# Patient Record
Sex: FEMALE | Race: WHITE | HISPANIC OR LATINO
[De-identification: names, ages, dates, MRNs, and addresses within clinical notes are randomized per-mention and may not be internally consistent; named-entity substitution may affect disease eponyms.]

---

## 2022-07-18 PROBLEM — Z00.00 ENCOUNTER FOR PREVENTIVE HEALTH EXAMINATION: Status: ACTIVE | Noted: 2022-07-18

## 2024-05-29 ENCOUNTER — APPOINTMENT (OUTPATIENT)
Dept: ORTHOPEDIC SURGERY | Facility: CLINIC | Age: 59
End: 2024-05-29

## 2024-06-05 ENCOUNTER — APPOINTMENT (OUTPATIENT)
Dept: ORTHOPEDIC SURGERY | Facility: CLINIC | Age: 59
End: 2024-06-05
Payer: MEDICAID

## 2024-06-05 DIAGNOSIS — M19.031 PRIMARY OSTEOARTHRITIS, RIGHT WRIST: ICD-10-CM

## 2024-06-05 DIAGNOSIS — M19.049 PRIMARY OSTEOARTHRITIS, UNSPECIFIED HAND: ICD-10-CM

## 2024-06-05 PROCEDURE — 99203 OFFICE O/P NEW LOW 30 MIN: CPT

## 2024-06-05 PROCEDURE — 73110 X-RAY EXAM OF WRIST: CPT | Mod: 50

## 2024-06-05 PROCEDURE — 73140 X-RAY EXAM OF FINGER(S): CPT | Mod: F2

## 2024-06-05 NOTE — ASSESSMENT
[FreeTextEntry1] : The condition was explained to the patient. Discussed that arthritis is a progressive degenerative process, and symptoms may have a waxing/waning course, which may be exacerbated by activity or trauma. Discussed treatment options - activity modification, bracing, steroid injection, or last resort surgery. Discussed increased propensity for stiffness due to underlying arthritis.  Given severity of hand arthritis with erosive changes of PIPJ and auto-fusion of RIGHT small finger, recommend re-evaluation with Rheumatologist to evaluate for autoimmune / inflammatory disorder and medical management of arthritis. Patient expressed understanding.  F/u with me PRN.

## 2024-06-05 NOTE — HISTORY OF PRESENT ILLNESS
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 c/o pain with cold weather and heavy lifting. Reports h/o RIGHT wrist fx ~9 years ago s/p ORIF and subsequent HUMBERTO ~1 year later by Dr. Ingram. - LEFT middle finger pain and swelling x 1.5 years. Reports that she saw a Rheumatologist years ago, and labs were wnl.  Hx: none. [FreeTextEntry1] : BILATERAL wrist and LEFT middle finger  [FreeTextEntry5] : CARLA JOSE MANUEL colbert [RHD] 59 year old female is here today c/o BILATERAL wrist pain x 5 years and LEFT middle finger pain and locking x 1.5 years. denies treatment for LEFT middle finger. h/o RIGHT wrist fracture ~9 years ago, surgically repaired by Dr. Ingram (hardware removed ~8 years ago). increased pain in wrists with cold weather and lifting heavy objects.  [de-identified] : ~5 years ago

## 2024-06-05 NOTE — IMAGING
[de-identified] : LEFT HAND nodular enlargement and swelling of IPJ of multiple digits. skin intact.  TTP to MF PIPJ. wrist ROM: good extension, flexion. good pronation, supination. good EPL, FPL. good finger extension, flex to half-fist. good finger abduction and adduction.  SILT to median, ulnar, radial distribution.  palpable radial pulse, brisk cap refill all digits. no triggering.   RIGHT HAND nodular enlargement and swelling of IPJ of multiple digits. skin intact.  wrist ROM: mild limited extension, flexion. good pronation, supination. good EPL, FPL. good finger extension, flex to half-fist. good finger abduction and adduction.  SILT to median, ulnar, radial distribution.  palpable radial pulse, brisk cap refill all digits. no triggering.   XRAYS OF LEFT HAND: no acute displaced fracture or dislocation. 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 XRAYS OF RIGHT HAND: no acute displaced fracture or dislocation. 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 auto-fusion of small finger PIPJ. deformity of distal radius and ulnar head, likely sequela of prior fracture.